# Patient Record
Sex: MALE | Race: BLACK OR AFRICAN AMERICAN | Employment: UNEMPLOYED | URBAN - METROPOLITAN AREA
[De-identification: names, ages, dates, MRNs, and addresses within clinical notes are randomized per-mention and may not be internally consistent; named-entity substitution may affect disease eponyms.]

---

## 2017-02-19 VITALS
HEART RATE: 71 BPM | WEIGHT: 153 LBS | HEIGHT: 67 IN | BODY MASS INDEX: 24.01 KG/M2 | SYSTOLIC BLOOD PRESSURE: 119 MMHG | TEMPERATURE: 97.6 F | DIASTOLIC BLOOD PRESSURE: 69 MMHG | RESPIRATION RATE: 19 BRPM | OXYGEN SATURATION: 97 %

## 2017-02-19 PROCEDURE — 99283 EMERGENCY DEPT VISIT LOW MDM: CPT

## 2017-02-19 RX ORDER — CLONIDINE HYDROCHLORIDE 0.3 MG/1
0.3 TABLET ORAL 3 TIMES DAILY
COMMUNITY

## 2017-02-19 RX ORDER — MINOXIDIL 10 MG/1
10 TABLET ORAL 3 TIMES DAILY
COMMUNITY

## 2017-02-20 ENCOUNTER — HOSPITAL ENCOUNTER (EMERGENCY)
Age: 57
Discharge: HOME | End: 2017-02-20
Attending: EMERGENCY MEDICINE | Admitting: EMERGENCY MEDICINE
Payer: MEDICARE

## 2017-02-20 DIAGNOSIS — I10 ESSENTIAL HYPERTENSION: ICD-10-CM

## 2017-02-20 DIAGNOSIS — R11.0 NAUSEA WITHOUT VOMITING: Primary | ICD-10-CM

## 2017-02-20 DIAGNOSIS — N18.6 ESRD (END STAGE RENAL DISEASE) ON DIALYSIS (HCC): ICD-10-CM

## 2017-02-20 DIAGNOSIS — Z99.2 ESRD (END STAGE RENAL DISEASE) ON DIALYSIS (HCC): ICD-10-CM

## 2017-02-20 PROCEDURE — 74011250637 HC RX REV CODE- 250/637: Performed by: EMERGENCY MEDICINE

## 2017-02-20 RX ORDER — PROMETHAZINE HYDROCHLORIDE 25 MG/1
25 TABLET ORAL
Status: COMPLETED | OUTPATIENT
Start: 2017-02-20 | End: 2017-02-20

## 2017-02-20 RX ORDER — PROMETHAZINE HYDROCHLORIDE 25 MG/1
25 TABLET ORAL
Qty: 12 TAB | Refills: 0 | Status: SHIPPED | OUTPATIENT
Start: 2017-02-20

## 2017-02-20 RX ADMIN — PROMETHAZINE HYDROCHLORIDE 25 MG: 25 TABLET ORAL at 00:49

## 2017-02-20 NOTE — ED PROVIDER NOTES
HPI Comments: 12:35 AM Avani Moura is a 61 y.o. male with a history of HTN, clot, and dialysis patient (M/W/F) who presents to ED c/o intermittent nausea onset 2 days ago. Pt reports excessive gas and took [employer] [facility] with no relief. Pt explains he was constipated a few days ago and took Miralax and had a normal BM. Pt denies abdominal pain. Pt was dialyzed on 2/17/17 and is scheduled to be dialyzed this morning at 10:00AM.  No other concerns at this time. Patient is a 61 y.o. male presenting with nausea. Nausea           Past Medical History:   Diagnosis Date    Clot     Dialysis patient [facility])     Hypertension        No past surgical history on file. No family history on file. Social History     Social History    Marital status: SINGLE     Spouse name: N/A    Number of children: N/A    Years of education: N/A     Occupational History    Not on file. Social History Main Topics    Smoking status: Not on file    Smokeless tobacco: Not on file    Alcohol use Not on file    Drug use: Not on file    Sexual activity: Not on file     Other Topics Concern    Not on file     Social History Narrative    No narrative on file         ALLERGIES: Morphine    Review of Systems   Constitutional: Negative. HENT: Negative. Eyes: Negative. Respiratory: Negative. Cardiovascular: Negative. Gastrointestinal: Positive for nausea. Negative for constipation. Endocrine: Negative. Genitourinary: Negative. Musculoskeletal: Negative. Skin: Negative. Allergic/Immunologic: Negative. Neurological: Negative. Hematological: Negative. Psychiatric/Behavioral: Negative. All other systems reviewed and are negative. Vitals:    02/19/17 2321   BP: 119/69   Pulse: 71   Resp: 19   Temp: 97.6 °F (36.4 °C)   SpO2: 97%   Weight: 69.4 kg (153 lb)   Height: 5' 7\" (1.702 m)            Physical Exam   Constitutional: He is oriented to person, place, and time.  He appears well-developed and well-nourished. No distress. HENT:   Head: Normocephalic. Mouth/Throat: Oropharynx is clear and moist.   Eyes: Conjunctivae and EOM are normal. Pupils are equal, round, and reactive to light. Neck: Normal range of motion. Neck supple. Cardiovascular: Normal rate, regular rhythm, normal heart sounds and intact distal pulses. No murmur heard. shunt in left arm   Pulmonary/Chest: Effort normal and breath sounds normal. No respiratory distress. He has no wheezes. He has no rales. He exhibits no tenderness. Abdominal: Soft. Bowel sounds are normal. He exhibits no distension and no mass. There is no tenderness. There is no rebound and no guarding. Abdomen soft and non tender    Musculoskeletal: Normal range of motion. He exhibits no edema or tenderness. Neurological: He is alert and oriented to person, place, and time. No cranial nerve deficit. He exhibits normal muscle tone. Coordination normal.   Skin: Skin is warm and dry. No rash noted. Psychiatric: He has a normal mood and affect. His behavior is normal. Judgment and thought content normal.   Nursing note and vitals reviewed. MDM  Number of Diagnoses or Management Options  ESRD (end stage renal disease) on dialysis [facility]): established and improving  Essential hypertension: established and improving  Nausea without vomiting: new and does not require workup  Risk of Complications, Morbidity, and/or Mortality  Presenting problems: low  Diagnostic procedures: low  Management options: low    Patient Progress  Patient progress: stable    ED Course       Procedures    Vitals:  Patient Vitals for the past 12 hrs:   Temp Pulse Resp BP SpO2   02/19/17 2321 97.6 °F (36.4 °C) 71 19 119/69 97 %       Medications ordered:   Medications   promethazine (PHENERGAN) tablet 25 mg (not administered)         Disposition:  Diagnosis:   1.  Nausea without vomiting        Disposition: discharged    Follow-up Information     Follow up With Details Comments Contact Info    Dialysis Appointment Go to appointment this morning at 10:00AM without fail     [facility] Go to As needed, If symptoms worsen, or with new symptoms [address] [facility]  [phone]            Patient's Medications   Start Taking    PROMETHAZINE (PHENERGAN) 25 MG TABLET    Take 1 Tab by mouth every six (6) hours as needed for Nausea. Caution: This medication may make you drowsy. Avoid driving while under the influence of this medication. Continue Taking    APIXABAN (ELIQUIS) 5 MG TABLET    Take 5 mg by mouth two (2) times a day. CLONIDINE HCL (CATAPRES) 0.3 MG TABLET    Take 0.3 mg by mouth three (3) times daily. MINOXIDIL (LONITEN) 10 MG TABLET    Take 10 mg by mouth three (3) times daily. These Medications have changed    No medications on file   Stop Taking    No medications on file     Scribe Attestation:     I, Lexus Marroquin, scribing for and in the presence of  Keena Monson MD February 20, 2017 at 12:47 AM     Physician Attestation:   I personally performed the services described in this documentation, reviewed and edited the documentation which was dictated to the scribe in my presence, and it accurately records my words and actions.  Javier Schaeffer MD  February 20, 2017     Signed by: Jackelin Vernon, 02/20/17, 12:40 AM

## 2017-02-20 NOTE — DISCHARGE INSTRUCTIONS
Nausea and Vomiting: Care Instructions  Your Care Instructions    When you are nauseated, you may feel weak and sweaty and notice a lot of saliva in your mouth. Nausea often leads to vomiting. Most of the time you do not need to worry about nausea and vomiting, but they can be signs of other illnesses. Two common causes of nausea and vomiting are stomach flu and food poisoning. Nausea and vomiting from viral stomach flu will usually start to improve within 24 hours. Nausea and vomiting from food poisoning may last from 12 to 48 hours. The doctor has checked you carefully, but problems can develop later. If you notice any problems or new symptoms, get medical treatment right away. Follow-up care is a key part of your treatment and safety. Be sure to make and go to all appointments, and call your doctor if you are having problems. It's also a good idea to know your test results and keep a list of the medicines you take. How can you care for yourself at home? · To prevent dehydration, drink plenty of fluids, enough so that your urine is light yellow or clear like water. Choose water and other caffeine-free clear liquids until you feel better. If you have kidney, heart, or liver disease and have to limit fluids, talk with your doctor before you increase the amount of fluids you drink. · Rest in bed until you feel better. · When you are able to eat, try clear soups, mild foods, and liquids until all symptoms are gone for 12 to 48 hours. Other good choices include dry toast, crackers, cooked cereal, and gelatin dessert, such as Jell-O. When should you call for help? Call 911 anytime you think you may need emergency care. For example, call if:  · You passed out (lost consciousness). Call your doctor now or seek immediate medical care if:  · You have symptoms of dehydration, such as:  ¨ Dry eyes and a dry mouth. ¨ Passing only a little dark urine.   ¨ Feeling thirstier than usual.  · You have new or worsening belly pain. · You have a new or higher fever. · You vomit blood or what looks like coffee grounds. Watch closely for changes in your health, and be sure to contact your doctor if:  · You have ongoing nausea and vomiting. · Your vomiting is getting worse. · Your vomiting lasts longer than 2 days. · You are not getting better as expected. Where can you learn more? Go to http://venice-diane.info/. Enter 25 [id number] in the search box to learn more about \"Nausea and Vomiting: Care Instructions. \"  Current as of: May 27, 2016  Content Version: 11.1  © 4170-8290 [employer]. Care instructions adapted under license by [employer] (which disclaims liability or warranty for this information). If you have questions about a medical condition or this instruction, always ask your healthcare professional. [address] any warranty or liability for your use of this information.

## 2018-12-11 ENCOUNTER — HOSPITAL ENCOUNTER (EMERGENCY)
Age: 58
Discharge: STILL ADMITTED OR ADMINISTRATIVE | End: 2018-12-11
Attending: EMERGENCY MEDICINE
Payer: MEDICARE

## 2018-12-11 PROCEDURE — 75810000275 HC EMERGENCY DEPT VISIT NO LEVEL OF CARE

## 2019-01-02 ENCOUNTER — APPOINTMENT (OUTPATIENT)
Dept: GENERAL RADIOLOGY | Age: 59
End: 2019-01-02
Attending: PHYSICIAN ASSISTANT
Payer: MEDICARE

## 2019-01-02 ENCOUNTER — HOSPITAL ENCOUNTER (EMERGENCY)
Age: 59
Discharge: HOME | End: 2019-01-03
Attending: EMERGENCY MEDICINE
Payer: MEDICARE

## 2019-01-02 ENCOUNTER — APPOINTMENT (OUTPATIENT)
Dept: CT IMAGING | Age: 59
End: 2019-01-02
Attending: EMERGENCY MEDICINE
Payer: MEDICARE

## 2019-01-02 DIAGNOSIS — R07.9 CHEST PAIN, UNSPECIFIED TYPE: Primary | ICD-10-CM

## 2019-01-02 DIAGNOSIS — R42 DIZZINESS: ICD-10-CM

## 2019-01-02 DIAGNOSIS — N18.9 CHRONIC RENAL FAILURE, UNSPECIFIED CKD STAGE: ICD-10-CM

## 2019-01-02 LAB
ANION GAP SERPL CALC-SCNC: 13 MMOL/L (ref 3–18)
BASOPHILS # BLD: 0 K/UL (ref 0–0.1)
BASOPHILS NFR BLD: 1 % (ref 0–2)
BNP SERPL-MCNC: ABNORMAL PG/ML (ref 0–900)
BUN SERPL-MCNC: 57 MG/DL (ref 7–18)
BUN/CREAT SERPL: 8 (ref 12–20)
CALCIUM SERPL-MCNC: 8.8 MG/DL (ref 8.5–10.1)
CHLORIDE SERPL-SCNC: 100 MMOL/L (ref 100–108)
CK MB CFR SERPL CALC: 4.2 % (ref 0–4)
CK MB SERPL-MCNC: 3.9 NG/ML (ref 5–25)
CK SERPL-CCNC: 93 U/L (ref 39–308)
CO2 SERPL-SCNC: 25 MMOL/L (ref 21–32)
CREAT SERPL-MCNC: 7.34 MG/DL (ref 0.6–1.3)
DIFFERENTIAL METHOD BLD: ABNORMAL
EOSINOPHIL # BLD: 0.1 K/UL (ref 0–0.4)
EOSINOPHIL NFR BLD: 1 % (ref 0–5)
ERYTHROCYTE [DISTWIDTH] IN BLOOD BY AUTOMATED COUNT: 18.3 % (ref 11.6–14.5)
GLUCOSE SERPL-MCNC: 100 MG/DL (ref 74–99)
HCT VFR BLD AUTO: 29.4 % (ref 36–48)
HGB BLD-MCNC: 9.4 G/DL (ref 13–16)
LYMPHOCYTES # BLD: 1.2 K/UL (ref 0.9–3.6)
LYMPHOCYTES NFR BLD: 17 % (ref 21–52)
MCH RBC QN AUTO: 29.1 PG (ref 24–34)
MCHC RBC AUTO-ENTMCNC: 32 G/DL (ref 31–37)
MCV RBC AUTO: 91 FL (ref 74–97)
MONOCYTES # BLD: 0.6 K/UL (ref 0.05–1.2)
MONOCYTES NFR BLD: 8 % (ref 3–10)
NEUTS SEG # BLD: 5.2 K/UL (ref 1.8–8)
NEUTS SEG NFR BLD: 73 % (ref 40–73)
PLATELET # BLD AUTO: 147 K/UL (ref 135–420)
PMV BLD AUTO: 11.9 FL (ref 9.2–11.8)
POTASSIUM SERPL-SCNC: 5.3 MMOL/L (ref 3.5–5.5)
RBC # BLD AUTO: 3.23 M/UL (ref 4.7–5.5)
SODIUM SERPL-SCNC: 138 MMOL/L (ref 136–145)
TROPONIN I SERPL-MCNC: 0.49 NG/ML (ref 0–0.04)
WBC # BLD AUTO: 7.1 K/UL (ref 4.6–13.2)

## 2019-01-02 PROCEDURE — 71046 X-RAY EXAM CHEST 2 VIEWS: CPT

## 2019-01-02 PROCEDURE — 85025 COMPLETE CBC W/AUTO DIFF WBC: CPT

## 2019-01-02 PROCEDURE — 93005 ELECTROCARDIOGRAM TRACING: CPT

## 2019-01-02 PROCEDURE — 96374 THER/PROPH/DIAG INJ IV PUSH: CPT

## 2019-01-02 PROCEDURE — 71275 CT ANGIOGRAPHY CHEST: CPT

## 2019-01-02 PROCEDURE — 74011636320 HC RX REV CODE- 636/320: Performed by: EMERGENCY MEDICINE

## 2019-01-02 PROCEDURE — 99285 EMERGENCY DEPT VISIT HI MDM: CPT

## 2019-01-02 PROCEDURE — 74011250636 HC RX REV CODE- 250/636

## 2019-01-02 PROCEDURE — 82550 ASSAY OF CK (CPK): CPT

## 2019-01-02 PROCEDURE — 74011250636 HC RX REV CODE- 250/636: Performed by: EMERGENCY MEDICINE

## 2019-01-02 PROCEDURE — 74011250637 HC RX REV CODE- 250/637: Performed by: EMERGENCY MEDICINE

## 2019-01-02 PROCEDURE — 80048 BASIC METABOLIC PNL TOTAL CA: CPT

## 2019-01-02 PROCEDURE — 83880 ASSAY OF NATRIURETIC PEPTIDE: CPT

## 2019-01-02 RX ORDER — ACETAMINOPHEN 325 MG/1
650 TABLET ORAL
Status: COMPLETED | OUTPATIENT
Start: 2019-01-02 | End: 2019-01-02

## 2019-01-02 RX ORDER — ONDANSETRON 2 MG/ML
4 INJECTION INTRAMUSCULAR; INTRAVENOUS
Status: COMPLETED | OUTPATIENT
Start: 2019-01-03 | End: 2019-01-02

## 2019-01-02 RX ORDER — ONDANSETRON 2 MG/ML
INJECTION INTRAMUSCULAR; INTRAVENOUS
Status: COMPLETED
Start: 2019-01-02 | End: 2019-01-02

## 2019-01-02 RX ADMIN — ONDANSETRON 4 MG: 2 INJECTION INTRAMUSCULAR; INTRAVENOUS at 23:50

## 2019-01-02 RX ADMIN — ACETAMINOPHEN 650 MG: 325 TABLET ORAL at 22:47

## 2019-01-02 RX ADMIN — IOPAMIDOL 75 ML: 755 INJECTION, SOLUTION INTRAVENOUS at 23:08

## 2019-01-02 RX ADMIN — ONDANSETRON 4 MG: 2 INJECTION INTRAMUSCULAR; INTRAVENOUS at 23:51

## 2019-01-03 VITALS
HEART RATE: 70 BPM | SYSTOLIC BLOOD PRESSURE: 129 MMHG | RESPIRATION RATE: 18 BRPM | DIASTOLIC BLOOD PRESSURE: 67 MMHG | TEMPERATURE: 97.7 F | OXYGEN SATURATION: 99 %

## 2019-01-03 LAB
ATRIAL RATE: 344 BPM
ATRIAL RATE: 366 BPM
CALCULATED R AXIS, ECG10: -88 DEGREES
CALCULATED R AXIS, ECG10: -95 DEGREES
CALCULATED T AXIS, ECG11: 87 DEGREES
CALCULATED T AXIS, ECG11: 91 DEGREES
DIAGNOSIS, 93000: NORMAL
DIAGNOSIS, 93000: NORMAL
Q-T INTERVAL, ECG07: 512 MS
Q-T INTERVAL, ECG07: 522 MS
QRS DURATION, ECG06: 190 MS
QRS DURATION, ECG06: 196 MS
QTC CALCULATION (BEZET), ECG08: 552 MS
QTC CALCULATION (BEZET), ECG08: 575 MS
TROPONIN I SERPL-MCNC: 0.57 NG/ML (ref 0–0.04)
VENTRICULAR RATE, ECG03: 70 BPM
VENTRICULAR RATE, ECG03: 73 BPM

## 2019-01-03 PROCEDURE — 84484 ASSAY OF TROPONIN QUANT: CPT

## 2019-01-03 NOTE — ED TRIAGE NOTES
Patient A/O x 4, complaining of chest pain, SOB x 1 week. Patient states his pacemaker was placed to right side of chest  1 week ago at [address] [facility] and from then he's been having these symptoms. Denies N/V, abdominal pain.

## 2019-01-03 NOTE — ED NOTES
Patient on side chair complaining of neck pain and some chest pain. Patient states, \"I need some tylenol\". Patient made aware he received tylenol 650 mg less than 4 hours ago and it's to be given every 4 hours. Patient verbalized understanding. Dr. Ember Michaels made aware. Awaiting further orders from provider.

## 2019-01-03 NOTE — DISCHARGE INSTRUCTIONS

## 2019-01-03 NOTE — ED NOTES
Patient sitting up on side of stretcher complaining of chest pain. Patient requesting tylenol. Received RBVO for tylenol 650 mg PO from Dr. Grazyna Espinoza. Will order and administer at this time.

## 2019-01-03 NOTE — ED NOTES
Patient disconnected from cardiac monitor, patient made aware he is not discharged. Dr. Piotr Elder made aware. Will place patient back on cardiac monitor at this time.

## 2019-01-03 NOTE — ED PROVIDER NOTES
EMERGENCY DEPARTMENT HISTORY AND PHYSICAL EXAM 
 
9:28 PM 
 
 
Date: 1/2/2019 Patient Name: Gilma Lazcano History of Presenting Illness Chief Complaint Patient presents with  Chest Pain  Shortness of Breath History Provided By: Patient Chief Complaint: dizziness Duration:  Hours Timing:  Acute, intermittent Location: head Quality: Dull Severity: Mild Modifying Factors: none Associated Symptoms: CP Additional History (Context): Gilma Lazcano is a 60 y.o. male with hypertension who presents with acute and intermittent dizziness s/p dialysis earlier today. Pt confirms associated sx of mild CP x1 week s/p pacemaker placement at [facility]. He normally has dialysis Monday, Wednesday and Friday and has not missed any sessions. No other concerns or symptoms at this time. PCP: Unknown, Provider Current Outpatient Medications Medication Sig Dispense Refill  promethazine (PHENERGAN) 25 mg tablet Take 1 Tab by mouth every six (6) hours as needed for Nausea. Caution: This medication may make you drowsy. Avoid driving while under the influence of this medication. 12 Tab 0  
 minoxidil (LONITEN) 10 mg tablet Take 10 mg by mouth three (3) times daily.  cloNIDine HCl (CATAPRES) 0.3 mg tablet Take 0.3 mg by mouth three (3) times daily.  apixaban (ELIQUIS) 5 mg tablet Take 5 mg by mouth two (2) times a day. Past History Past Medical History: 
Past Medical History:  
Diagnosis Date  Clot  Dialysis patient [facility])  Hypertension Past Surgical History: No past surgical history on file. Family History: No family history on file. Social History: 
Social History Tobacco Use  Smoking status: Not on file Substance Use Topics  Alcohol use: Not on file  Drug use: Not on file Allergies: Allergies Allergen Reactions  Morphine Itching Review of Systems Review of Systems Cardiovascular: Positive for chest pain. Neurological: Positive for dizziness. All other systems reviewed and are negative. Physical Exam  
 
Visit Vitals /57 (BP 1 Location: Right arm, BP Patient Position: At rest) Pulse 74 Temp 96.8 °F (36 °C) Resp 18 SpO2 99% Physical Exam  
Physical Exam: 
. Patient Vitals for the past 12 hrs: 
 Temp Pulse Resp BP SpO2  
01/02/19 2058 96.8 °F (36 °C) 74 18 106/57 99 % Gen: Well developed, well nourished 60 y.o. male HEENT: Normocephalic, atraumatic, no scleral icterus Respiratory: No accessory muscle use No wheeze, No rales, No rhonchi. Normal chest wall excursion. No subcutaneous air, no rib crepitus Cardiovascular: Regular rhythm and rate, Normal pulses, Normal perfusion. No edema. 2/6 systolic murmur. Gastrointestinal: Non distended, Non tender, No masses. No ascites. No organomegaly. No evidence of trauma Musculoskeletal: Full range of motion at all other tested joints. No joint effusions. No spinal tenderness. Neuro: Normal strength, Normal sensation. Normal speech. No ataxia. Cranial Nerves II-XII normal as tested. Skin: No rash, No lesions, petechia or purpura. Warm and dry. Dialysis fistula LUE. Dialysis catheter RLE. Psyche: No suicidal ideation, No homicidal ideation. No hallucinations. Organized thoughts. Heme: Normal 
: Deferred Beth Kessler Patient Vitals for the past 12 hrs: 
 Temp Pulse Resp BP SpO2  
01/02/19 2058 96.8 °F (36 °C) 74 18 106/57 99 % Gen: Well developed, well nourished 60 y.o. male HEENT: Normocephalic, atraumatic, no scleral icterus Respiratory: No accessory muscle use No wheeze, No rales, No rhonchi. Normal chest wall excursion. No subcutaneous air, no rib crepitus Cardiovascular: Regular rhythm and rate, Normal pulses, Normal perfusion. No edema Gastrointestinal: Non distended, Non tender, No masses. No ascites. No organomegaly. No evidence of trauma Musculoskeletal: Full range of motion at all other tested joints. No joint effusions. No spinal tenderness. Neuro: Normal strength, Normal sensation. Normal speech. No ataxia. Cranial Nerves II-XII normal as tested. Skin: No rash, No lesions, petechia or purpura. Warm and dry Psyche: No suicidal ideation, No homicidal ideation. No hallucinations. Organized thoughts. Heme: Normal 
: Deferred Diagnostic Study Results Labs - Recent Results (from the past 12 hour(s)) EKG, 12 LEAD, INITIAL Collection Time: 01/02/19  9:06 PM  
Result Value Ref Range Ventricular Rate 70 BPM  
 Atrial Rate 366 BPM  
 QRS Duration 190 ms Q-T Interval 512 ms QTC Calculation (Bezet) 552 ms Calculated R Axis -88 degrees Calculated T Axis 91 degrees Diagnosis Electronic ventricular pacemaker No previous ECGs available CBC WITH AUTOMATED DIFF Collection Time: 01/02/19  9:15 PM  
Result Value Ref Range WBC 7.1 4.6 - 13.2 K/uL  
 RBC 3.23 (L) 4.70 - 5.50 M/uL HGB 9.4 (L) 13.0 - 16.0 g/dL HCT 29.4 (L) 36.0 - 48.0 % MCV 91.0 74.0 - 97.0 FL  
 MCH 29.1 24.0 - 34.0 PG  
 MCHC 32.0 31.0 - 37.0 g/dL  
 RDW 18.3 (H) 11.6 - 14.5 % PLATELET 154 614 - 825 K/uL MPV 11.9 (H) 9.2 - 11.8 FL  
 NEUTROPHILS 73 40 - 73 % LYMPHOCYTES 17 (L) 21 - 52 % MONOCYTES 8 3 - 10 % EOSINOPHILS 1 0 - 5 % BASOPHILS 1 0 - 2 %  
 ABS. NEUTROPHILS 5.2 1.8 - 8.0 K/UL  
 ABS. LYMPHOCYTES 1.2 0.9 - 3.6 K/UL  
 ABS. MONOCYTES 0.6 0.05 - 1.2 K/UL  
 ABS. EOSINOPHILS 0.1 0.0 - 0.4 K/UL  
 ABS. BASOPHILS 0.0 0.0 - 0.1 K/UL  
 DF AUTOMATED METABOLIC PANEL, BASIC Collection Time: 01/02/19  9:15 PM  
Result Value Ref Range Sodium 138 136 - 145 mmol/L Potassium 5.3 3.5 - 5.5 mmol/L Chloride 100 100 - 108 mmol/L  
 CO2 25 21 - 32 mmol/L Anion gap 13 3.0 - 18 mmol/L Glucose 100 (H) 74 - 99 mg/dL BUN 57 (H) 7.0 - 18 MG/DL  Creatinine 7.34 (H) 0.6 - 1.3 MG/DL  
 BUN/Creatinine ratio 8 (L) 12 - 20 GFR est AA 9 (L) >60 ml/min/1.73m2 GFR est non-AA 8 (L) >60 ml/min/1.73m2 Calcium 8.8 8.5 - 10.1 MG/DL  
CARDIAC PANEL,(CK, CKMB & TROPONIN) Collection Time: 01/02/19  9:15 PM  
Result Value Ref Range CK 93 39 - 308 U/L  
 CK - MB 3.9 (H) <3.6 ng/ml CK-MB Index 4.2 (H) 0.0 - 4.0 % Troponin-I, QT 0.49 (H) 0.0 - 0.045 NG/ML  
NT-PRO BNP Collection Time: 01/02/19  9:15 PM  
Result Value Ref Range NT pro-BNP >175,000 (H) 0 - 900 PG/ML Radiologic Studies -  
XR CHEST PA LAT    (Results Pending) Cta Chest W Or W Wo Cont Result Date: 1/3/2019 IMPRESSION[de-identified] Distal segmental and subsegmental branches not well evaluated. Central and proximal vessels appear patent. Aortic lumen not diagnostically evaluated. There is no aneurysm. Moderate atherosclerosis. Coronary artery disease. Cardiomegaly and reflux of contrast into the IVC and hepatic veins suggest a component of right-sided heart failure. Scattered subpleural bulla are seen at the right lung apex. Renal atrophy and large left renal cyst. Thank you for this referral. 
 
 
XR preliminary reading done by Dr. South Paulino MD; pt xray shows pacemaker, cardiomegaly. No PTX, PNA. MEDICAL DECISION MAKING 
10:18 PM 
The patient has cardiomegaly on his chest x-ray. I do not see a prior chest x-ray to compare it to. He also has a 2/6 systolic murmur. Concerned that this cardiomegaly may be new, and possibly related to his recent pacemaker surgery. I have ordered a CT scan of the chest to evaluate for the presence of a blood clot given his dizziness and chest pain. I also hope to be able to ascertain whether or not he has a pericardial effusion. 2:50 AM 
The patient remained pain free after a period of observation in the emergency department. No clinically significant ST or T wave changes on ECG monitoring. Initial and follow-up cardiac enzymes were noted.   The patient is low risk for outpatient management. All of their questions were answered, and I encouraged close follow-up as an outpatient. I did discuss the risks and benefits of further treatment and observation both in the emergency department, as well as in the hospital under observation status. The patient ultimately decided to follow up as an outpatient. I counseled the patient that they may return at any time if they change their mind about wanting to stay for further testing. Medical Decision Making I am the first provider for this patient. I reviewed the vital signs, available nursing notes, past medical history, past surgical history, family history and social history. Vital Signs-Reviewed the patient's vital signs. Pulse Oximetry Analysis -  99% on room air (Interpretation WNL) Cardiac Monitor: 
Rate: 74 BPM 
 
EKG: Paced rhythm at 70 bpm, QRS duration of 190 ms, no evidence of STEMI. EKG was completed at 9:06 PM 
 
 
Diagnosis Diagnosis: 1. Chest pain, unspecified type 2. Chronic renal failure, unspecified CKD stage 3. Dizziness There is no problem list on file for this patient. Disposition: 
Discharged Discharge Medications:  
Current Discharge Medication List  
  
 
 
 
 
  
Medication List  
  
ASK your doctor about these medications   
cloNIDine HCl 0.3 mg tablet Commonly known as:  CATAPRES 
  
ELIQUIS 5 mg tablet Generic drug:  apixaban 
  
minoxidil 10 mg tablet Commonly known as:  LONITEN 
  
promethazine 25 mg tablet Commonly known as:  PHENERGAN Take 1 Tab by mouth every six (6) hours as needed for Nausea. Caution: This medication may make you drowsy. Avoid driving while under the influence of this medication. _______________________________ Scribe Attestation Noemy Saul acting as a scribe for and in the presence of Caitlyn Mendoza MD     
January 02, 2019 at 9:28 PM 
    
Provider Attestation: I personally performed the services described in the documentation, reviewed the documentation, as recorded by the scribe in my presence, and it accurately and completely records my words and actions. January 02, 2019 at 9:28 PM - Dates, Lincoln Patel MD   
 
 
_______________________________

## 2019-01-03 NOTE — ED NOTES
Patient returned from Ct scan, sitting up on side of bed complaining of nausea and chest tightness. Dr. Peter Florence made aware. Received RBVO for EKG, zofran 4 mg IV. Will administer now.

## 2019-01-03 NOTE — ED NOTES
I have reviewed discharge instructions with the patient. The patient verbalized understanding. Patient looks comfortable, left ED in stable condition. Vital signs stable upon discharge. Ambulatory, steady gait noted. Patient states chest pain has subsided.

## 2019-01-03 NOTE — ED NOTES
I performed a brief evaluation, including history and physical, of the patient here in triage and I have determined that pt will need further treatment and evaluation from the main side ER physician. I have placed initial orders to help in expediting patients care. January 02, 2019 at 9:03 PM - Tati Askew PA-C Visit Vitals /57 (BP 1 Location: Right arm, BP Patient Position: At rest) Pulse 74 Temp 96.8 °F (36 °C) Resp 18 SpO2 99%

## 2019-01-03 NOTE — ED PROVIDER NOTES
EMERGENCY DEPARTMENT HISTORY AND PHYSICAL EXAM 
 
9:28 PM 
 
 
Date: 1/2/2019 Patient Name: Gilma Lazcano History of Presenting Illness Chief Complaint Patient presents with  Chest Pain  Shortness of Breath History Provided By: Patient Chief Complaint: dizziness Duration:  Hours Timing:  Acute, intermittent Location: head Quality: Dull Severity: Mild Modifying Factors: none Associated Symptoms: CP Additional History (Context): Gilma Lazcano is a 60 y.o. male with hypertension who presents with acute and intermittent dizziness s/p dialysis earlier today. Pt confirms associated sx of mild CP x1 week s/p pacemaker placement at [facility]. He normally has dialysis Monday, Wednesday and Friday and has not missed any sessions. No other concerns or symptoms at this time. PCP: Unknown, Provider Current Outpatient Medications Medication Sig Dispense Refill  promethazine (PHENERGAN) 25 mg tablet Take 1 Tab by mouth every six (6) hours as needed for Nausea. Caution: This medication may make you drowsy. Avoid driving while under the influence of this medication. 12 Tab 0  
 minoxidil (LONITEN) 10 mg tablet Take 10 mg by mouth three (3) times daily.  cloNIDine HCl (CATAPRES) 0.3 mg tablet Take 0.3 mg by mouth three (3) times daily.  apixaban (ELIQUIS) 5 mg tablet Take 5 mg by mouth two (2) times a day. Past History Past Medical History: 
Past Medical History:  
Diagnosis Date  Clot  Dialysis patient [facility])  Hypertension Past Surgical History: No past surgical history on file. Family History: No family history on file. Social History: 
Social History Tobacco Use  Smoking status: Not on file Substance Use Topics  Alcohol use: Not on file  Drug use: Not on file Allergies: Allergies Allergen Reactions  Morphine Itching Review of Systems Review of Systems Cardiovascular: Positive for chest pain. Neurological: Positive for dizziness. All other systems reviewed and are negative. Physical Exam  
 
Visit Vitals /57 (BP 1 Location: Right arm, BP Patient Position: At rest) Pulse 74 Temp 96.8 °F (36 °C) Resp 18 SpO2 99% Physical Exam  
Physical Exam: 
. Patient Vitals for the past 12 hrs: 
 Temp Pulse Resp BP SpO2  
01/02/19 2058 96.8 °F (36 °C) 74 18 106/57 99 % Gen: Well developed, well nourished 60 y.o. male HEENT: Normocephalic, atraumatic, no scleral icterus Respiratory: No accessory muscle use No wheeze, No rales, No rhonchi. Normal chest wall excursion. No subcutaneous air, no rib crepitus Cardiovascular: Regular rhythm and rate, Normal pulses, Normal perfusion. No edema. 2/6 systolic murmur. Gastrointestinal: Non distended, Non tender, No masses. No ascites. No organomegaly. No evidence of trauma Musculoskeletal: Full range of motion at all other tested joints. No joint effusions. No spinal tenderness. Neuro: Normal strength, Normal sensation. Normal speech. No ataxia. Cranial Nerves II-XII normal as tested. Skin: No rash, No lesions, petechia or purpura. Warm and dry. Dialysis fistula LUE. Dialysis catheter RLE. Psyche: No suicidal ideation, No homicidal ideation. No hallucinations. Organized thoughts. Heme: Normal 
: Deferred [place] Patient Vitals for the past 12 hrs: 
 Temp Pulse Resp BP SpO2  
01/02/19 2058 96.8 °F (36 °C) 74 18 106/57 99 % Gen: Well developed, well nourished 60 y.o. male HEENT: Normocephalic, atraumatic, no scleral icterus Respiratory: No accessory muscle use No wheeze, No rales, No rhonchi. Normal chest wall excursion. No subcutaneous air, no rib crepitus Cardiovascular: Regular rhythm and rate, Normal pulses, Normal perfusion. No edema Gastrointestinal: Non distended, Non tender, No masses. No ascites. No organomegaly. No evidence of trauma Musculoskeletal: Full range of motion at all other tested joints. No joint effusions. No spinal tenderness. Neuro: Normal strength, Normal sensation. Normal speech. No ataxia. Cranial Nerves II-XII normal as tested. Skin: No rash, No lesions, petechia or purpura. Warm and dry Psyche: No suicidal ideation, No homicidal ideation. No hallucinations. Organized thoughts. Heme: Normal 
: Deferred Diagnostic Study Results Labs - Recent Results (from the past 12 hour(s)) EKG, 12 LEAD, INITIAL Collection Time: 01/02/19  9:06 PM  
Result Value Ref Range Ventricular Rate 70 BPM  
 Atrial Rate 366 BPM  
 QRS Duration 190 ms Q-T Interval 512 ms QTC Calculation (Bezet) 552 ms Calculated R Axis -88 degrees Calculated T Axis 91 degrees Diagnosis Electronic ventricular pacemaker No previous ECGs available CBC WITH AUTOMATED DIFF Collection Time: 01/02/19  9:15 PM  
Result Value Ref Range WBC 7.1 4.6 - 13.2 K/uL  
 RBC 3.23 (L) 4.70 - 5.50 M/uL HGB 9.4 (L) 13.0 - 16.0 g/dL HCT 29.4 (L) 36.0 - 48.0 % MCV 91.0 74.0 - 97.0 FL  
 MCH 29.1 24.0 - 34.0 PG  
 MCHC 32.0 31.0 - 37.0 g/dL  
 RDW 18.3 (H) 11.6 - 14.5 % PLATELET 827 468 - 320 K/uL MPV 11.9 (H) 9.2 - 11.8 FL  
 NEUTROPHILS 73 40 - 73 % LYMPHOCYTES 17 (L) 21 - 52 % MONOCYTES 8 3 - 10 % EOSINOPHILS 1 0 - 5 % BASOPHILS 1 0 - 2 %  
 ABS. NEUTROPHILS 5.2 1.8 - 8.0 K/UL  
 ABS. LYMPHOCYTES 1.2 0.9 - 3.6 K/UL  
 ABS. MONOCYTES 0.6 0.05 - 1.2 K/UL  
 ABS. EOSINOPHILS 0.1 0.0 - 0.4 K/UL  
 ABS. BASOPHILS 0.0 0.0 - 0.1 K/UL  
 DF AUTOMATED METABOLIC PANEL, BASIC Collection Time: 01/02/19  9:15 PM  
Result Value Ref Range Sodium 138 136 - 145 mmol/L Potassium 5.3 3.5 - 5.5 mmol/L Chloride 100 100 - 108 mmol/L  
 CO2 25 21 - 32 mmol/L Anion gap 13 3.0 - 18 mmol/L Glucose 100 (H) 74 - 99 mg/dL BUN 57 (H) 7.0 - 18 MG/DL  Creatinine 7.34 (H) 0.6 - 1.3 MG/DL  
 BUN/Creatinine ratio 8 (L) 12 - 20 GFR est AA 9 (L) >60 ml/min/1.73m2 GFR est non-AA 8 (L) >60 ml/min/1.73m2 Calcium 8.8 8.5 - 10.1 MG/DL  
CARDIAC PANEL,(CK, CKMB & TROPONIN) Collection Time: 01/02/19  9:15 PM  
Result Value Ref Range CK 93 39 - 308 U/L  
 CK - MB 3.9 (H) <3.6 ng/ml CK-MB Index 4.2 (H) 0.0 - 4.0 % Troponin-I, QT 0.49 (H) 0.0 - 0.045 NG/ML  
NT-PRO BNP Collection Time: 01/02/19  9:15 PM  
Result Value Ref Range NT pro-BNP >175,000 (H) 0 - 900 PG/ML Radiologic Studies -  
XR CHEST PA LAT    (Results Pending) XR preliminary reading done by Dr. Irl Shone, Scarlette Counter, MD; pt xray shows pacemaker, cardiomegaly. No PTX, PNA. Medical Decision Making I am the first provider for this patient. I reviewed the vital signs, available nursing notes, past medical history, past surgical history, family history and social history. Vital Signs-Reviewed the patient's vital signs. Pulse Oximetry Analysis -  99% on room air (Interpretation WNL) Cardiac Monitor: 
Rate: 74 BPM 
 
EKG: Paced rhythm at 70 bpm, QRS duration of 190 ms, no evidence of STEMI. EKG was completed at 9:06 PM 
 
10:18 PM 
The patient has cardiomegaly on his chest x-ray. I do not see a prior chest x-ray to compare it to. He also has a 2/6 systolic murmur. Concerned that this cardiomegaly may be new, and possibly related to his recent pacemaker surgery. I have ordered a CT scan of the chest to evaluate for the presence of a blood clot given his dizziness and chest pain. I also hope to be able to ascertain whether or not he has a pericardial effusion. Diagnosis Diagnosis: 1. Chest pain, unspecified type 2. Chronic renal failure, unspecified CKD stage 3. Dizziness There is no problem list on file for this patient. Disposition: 
Discharged Discharge Medications:  
Discharge Medication List as of 1/3/2019  2:37 AM  
  
 
 
 
 
  
Medication List  
  
 ASK your doctor about these medications   
cloNIDine HCl 0.3 mg tablet Commonly known as:  CATAPRES 
  
ELIQUIS 5 mg tablet Generic drug:  apixaban 
  
minoxidil 10 mg tablet Commonly known as:  LONITEN 
  
promethazine 25 mg tablet Commonly known as:  PHENERGAN Take 1 Tab by mouth every six (6) hours as needed for Nausea. Caution: This medication may make you drowsy. Avoid driving while under the influence of this medication. _______________________________ Scribe Attestation López Haw acting as a scribe for and in the presence of Francisco Mendoza MD     
January 02, 2019 at 9:28 PM 
    
Provider Attestation:     
I personally performed the services described in the documentation, reviewed the documentation, as recorded by the scribe in my presence, and it accurately and completely records my words and actions. January 02, 2019 at 9:28 PM - Francisco Mendoza MD   
 
 
_______________________________